# Patient Record
Sex: MALE | Race: WHITE | NOT HISPANIC OR LATINO | Employment: FULL TIME | URBAN - METROPOLITAN AREA
[De-identification: names, ages, dates, MRNs, and addresses within clinical notes are randomized per-mention and may not be internally consistent; named-entity substitution may affect disease eponyms.]

---

## 2023-03-28 ENCOUNTER — APPOINTMENT (OUTPATIENT)
Dept: MRI IMAGING | Facility: CLINIC | Age: 58
End: 2023-03-28
Attending: FAMILY MEDICINE
Payer: COMMERCIAL

## 2023-03-28 ENCOUNTER — HOSPITAL ENCOUNTER (EMERGENCY)
Facility: CLINIC | Age: 58
Discharge: HOME | End: 2023-03-28
Attending: FAMILY MEDICINE | Admitting: FAMILY MEDICINE
Payer: COMMERCIAL

## 2023-03-28 VITALS
RESPIRATION RATE: 18 BRPM | BODY MASS INDEX: 32.51 KG/M2 | HEART RATE: 68 BPM | OXYGEN SATURATION: 97 % | WEIGHT: 240 LBS | DIASTOLIC BLOOD PRESSURE: 87 MMHG | TEMPERATURE: 98.1 F | HEIGHT: 72 IN | SYSTOLIC BLOOD PRESSURE: 158 MMHG

## 2023-03-28 DIAGNOSIS — H81.399 PERIPHERAL VERTIGO, UNSPECIFIED LATERALITY: ICD-10-CM

## 2023-03-28 DIAGNOSIS — H93.19 TINNITUS, UNSPECIFIED LATERALITY: ICD-10-CM

## 2023-03-28 LAB
ANION GAP SERPL CALCULATED.3IONS-SCNC: 9 MMOL/L (ref 7–15)
BASOPHILS # BLD AUTO: 0.1 10E3/UL (ref 0–0.2)
BASOPHILS NFR BLD AUTO: 1 %
BUN SERPL-MCNC: 15.5 MG/DL (ref 6–20)
CALCIUM SERPL-MCNC: 9.5 MG/DL (ref 8.6–10)
CHLORIDE SERPL-SCNC: 103 MMOL/L (ref 98–107)
CREAT SERPL-MCNC: 1.05 MG/DL (ref 0.67–1.17)
DEPRECATED HCO3 PLAS-SCNC: 26 MMOL/L (ref 22–29)
EOSINOPHIL # BLD AUTO: 0.4 10E3/UL (ref 0–0.7)
EOSINOPHIL NFR BLD AUTO: 4 %
ERYTHROCYTE [DISTWIDTH] IN BLOOD BY AUTOMATED COUNT: 12.8 % (ref 10–15)
FLUAV RNA SPEC QL NAA+PROBE: NEGATIVE
FLUBV RNA RESP QL NAA+PROBE: NEGATIVE
GFR SERPL CREATININE-BSD FRML MDRD: 82 ML/MIN/1.73M2
GLUCOSE SERPL-MCNC: 92 MG/DL (ref 70–99)
HCT VFR BLD AUTO: 49.9 % (ref 40–53)
HGB BLD-MCNC: 17.4 G/DL (ref 13.3–17.7)
IMM GRANULOCYTES # BLD: 0 10E3/UL
IMM GRANULOCYTES NFR BLD: 0 %
LYMPHOCYTES # BLD AUTO: 2.8 10E3/UL (ref 0.8–5.3)
LYMPHOCYTES NFR BLD AUTO: 30 %
MCH RBC QN AUTO: 30.4 PG (ref 26.5–33)
MCHC RBC AUTO-ENTMCNC: 34.9 G/DL (ref 31.5–36.5)
MCV RBC AUTO: 87 FL (ref 78–100)
MONOCYTES # BLD AUTO: 0.8 10E3/UL (ref 0–1.3)
MONOCYTES NFR BLD AUTO: 9 %
NEUTROPHILS # BLD AUTO: 5.3 10E3/UL (ref 1.6–8.3)
NEUTROPHILS NFR BLD AUTO: 56 %
NRBC # BLD AUTO: 0 10E3/UL
NRBC BLD AUTO-RTO: 0 /100
PLATELET # BLD AUTO: 261 10E3/UL (ref 150–450)
POTASSIUM SERPL-SCNC: 4.5 MMOL/L (ref 3.4–5.3)
RBC # BLD AUTO: 5.73 10E6/UL (ref 4.4–5.9)
RSV RNA SPEC NAA+PROBE: NEGATIVE
SARS-COV-2 RNA RESP QL NAA+PROBE: NEGATIVE
SODIUM SERPL-SCNC: 138 MMOL/L (ref 136–145)
T4 FREE SERPL-MCNC: 1.4 NG/DL (ref 0.9–1.7)
TSH SERPL DL<=0.005 MIU/L-ACNC: 6.11 UIU/ML (ref 0.3–4.2)
WBC # BLD AUTO: 9.4 10E3/UL (ref 4–11)

## 2023-03-28 PROCEDURE — 70544 MR ANGIOGRAPHY HEAD W/O DYE: CPT

## 2023-03-28 PROCEDURE — 84443 ASSAY THYROID STIM HORMONE: CPT | Performed by: FAMILY MEDICINE

## 2023-03-28 PROCEDURE — C9803 HOPD COVID-19 SPEC COLLECT: HCPCS | Performed by: FAMILY MEDICINE

## 2023-03-28 PROCEDURE — A9585 GADOBUTROL INJECTION: HCPCS | Performed by: FAMILY MEDICINE

## 2023-03-28 PROCEDURE — 87637 SARSCOV2&INF A&B&RSV AMP PRB: CPT | Performed by: FAMILY MEDICINE

## 2023-03-28 PROCEDURE — 80048 BASIC METABOLIC PNL TOTAL CA: CPT | Performed by: FAMILY MEDICINE

## 2023-03-28 PROCEDURE — 85025 COMPLETE CBC W/AUTO DIFF WBC: CPT | Performed by: FAMILY MEDICINE

## 2023-03-28 PROCEDURE — 93010 ELECTROCARDIOGRAM REPORT: CPT | Performed by: FAMILY MEDICINE

## 2023-03-28 PROCEDURE — 70553 MRI BRAIN STEM W/O & W/DYE: CPT

## 2023-03-28 PROCEDURE — 255N000002 HC RX 255 OP 636: Performed by: FAMILY MEDICINE

## 2023-03-28 PROCEDURE — 93005 ELECTROCARDIOGRAM TRACING: CPT | Performed by: FAMILY MEDICINE

## 2023-03-28 PROCEDURE — 70549 MR ANGIOGRAPH NECK W/O&W/DYE: CPT

## 2023-03-28 PROCEDURE — 99285 EMERGENCY DEPT VISIT HI MDM: CPT | Mod: CS,25 | Performed by: FAMILY MEDICINE

## 2023-03-28 PROCEDURE — 84439 ASSAY OF FREE THYROXINE: CPT | Performed by: FAMILY MEDICINE

## 2023-03-28 PROCEDURE — 99284 EMERGENCY DEPT VISIT MOD MDM: CPT | Mod: CS | Performed by: FAMILY MEDICINE

## 2023-03-28 PROCEDURE — 96374 THER/PROPH/DIAG INJ IV PUSH: CPT | Mod: 59 | Performed by: FAMILY MEDICINE

## 2023-03-28 PROCEDURE — 96361 HYDRATE IV INFUSION ADD-ON: CPT | Performed by: FAMILY MEDICINE

## 2023-03-28 PROCEDURE — 258N000003 HC RX IP 258 OP 636: Performed by: FAMILY MEDICINE

## 2023-03-28 PROCEDURE — 36415 COLL VENOUS BLD VENIPUNCTURE: CPT | Performed by: FAMILY MEDICINE

## 2023-03-28 RX ORDER — GADOBUTROL 604.72 MG/ML
10 INJECTION INTRAVENOUS ONCE
Status: COMPLETED | OUTPATIENT
Start: 2023-03-28 | End: 2023-03-28

## 2023-03-28 RX ADMIN — GADOBUTROL 10 ML: 604.72 INJECTION INTRAVENOUS at 14:45

## 2023-03-28 RX ADMIN — SODIUM CHLORIDE 50 ML: 9 INJECTION, SOLUTION INTRAVENOUS at 15:10

## 2023-03-28 ASSESSMENT — ACTIVITIES OF DAILY LIVING (ADL)
ADLS_ACUITY_SCORE: 35
ADLS_ACUITY_SCORE: 33

## 2023-03-28 NOTE — ED TRIAGE NOTES
Pt has been having intermittent dizziness since January, constant the last 5 days.  Pt has lump on left side of neck for months that primary provider was not concerned about per pt.  Headache on back of neck started Friday or Saturday.      Triage Assessment     Row Name 03/28/23 1215       Triage Assessment (Adult)    Airway WDL WDL

## 2023-03-28 NOTE — ED NOTES
"Pt reports constant dizziness and feeling \"off\". Pt to have MRI of brain and neck; paperwork completed, and pt has removed all metals.   "

## 2023-03-28 NOTE — DISCHARGE INSTRUCTIONS
RETURN TO THE EMERGENCY ROOM FOR THE FOLLOWING:    Severely worsened dizziness with repeated vomiting and dehydration, fever greater than 101 and worsening head pain, or at anytime for any concern.    FOLLOW UP:    ENT (ear nose and throat) referral order placed at the time of your discharge.  Expect a phone call within the next 2-3 business days to help with scheduling.    TREATMENT RECOMMENDATIONS:    Ibuprofen 600 mg every six hours for pain (7 days duration).  Tylenol 1000 mg every six hours for pain (7 days duration).  Therefore, you can alternate these every three hours and do it safely.    NURSE ADVICE LINE:  [phone] or [phone]

## 2023-03-28 NOTE — ED PROVIDER NOTES
"  HPI   The patient is a 57-year-old male presenting with dizziness.  He has a known history of heavy alcohol use.  He will drink 2-3 times a week, 7-8 beers at a time.  He says, \"this is much less than I used to drink.  I do not think it is that big of a deal.\"  He occasionally will experience gout and he takes allopurinol.  He has esophagitis and he takes Prilosec.  He has hyperlipidemia and hypertension.  He has hypothyroidism and a thyroid nodule.  He takes Synthroid.  Known history of vitamin D deficiency.    The patient drank alcohol at the [employer] on Wednesday, 6 days ago.  The next morning he \"felt like I was still drunk.\"  He had 7-8 beers into the evening.  He described having persistent dizziness, \"like my equilibrium was off\" over the next 3 days.  Since then, he has had intermittent episodes of dizziness that seem to come on with movement and overall his symptoms have improved day by day.  He describes \"not being able to do anything though.  I could not drive my car well.  I could not seem to press the accelerator with my foot well.  I probably should not have been driving.  I probably should not have gone to work.\"  He describes a headache in the back of his head and in the base of his neck, left greater than right.  He denies trauma or injury.  He did have some nasal congestion earlier in the week but this has not been consistent through the weekend.  No cough.  No fever.  He gets transient nausea but no vomiting.  No diarrhea.  No myalgia.  He has felt rundown and tired.        Allergies:  Allergies   Allergen Reactions     Naproxen      tinnitus     Problem List:    There are no problems to display for this patient.     Past Medical History:    Past Medical History:   Diagnosis Date     Gout      Hypertension      Past Surgical History:    Past Surgical History:   Procedure Laterality Date     COLONOSCOPY N/A 12/15/2014    Procedure: COLONOSCOPY;  Surgeon: Js Oliva MD;  Location: [facility]" ESOPHAGOSCOPY, GASTROSCOPY, DUODENOSCOPY (EGD), COMBINED  12/27/2010    COMBINED ESOPHAGOSCOPY, GASTROSCOPY, DUODENOSCOPY (EGD) performed by TAINA ASH at [facility]     KNEE SURGERY       Family History:    No family history on file.  Social History:  Marital Status:   [2]  Social History     Tobacco Use     Smoking status: Never      Medications:    allopurinol (ZYLOPRIM) 300 MG tablet  levothyroxine (SYNTHROID,LEVOTHROID) 100 MCG tablet  Omeprazole 20 MG tablet      Review of Systems   All other systems reviewed and are negative.      PE   BP: (!) 158/87  Pulse: 68  Temp: 98.1  F (36.7  C)  Resp: 18  Height: 182.9 cm (6')  Weight: 108.9 kg (240 lb)  SpO2: 97 %  Physical Exam  Vitals and nursing note reviewed.   Constitutional:       General: He is not in acute distress.  HENT:      Head: Atraumatic.      Right Ear: External ear normal.      Left Ear: External ear normal.      Nose: Nose normal.      Mouth/Throat:      Mouth: Mucous membranes are moist.      Pharynx: Oropharynx is clear.   Eyes:      General: No scleral icterus.     Extraocular Movements: Extraocular movements intact.      Conjunctiva/sclera: Conjunctivae normal.      Pupils: Pupils are equal, round, and reactive to light.   Cardiovascular:      Rate and Rhythm: Normal rate.   Pulmonary:      Effort: Pulmonary effort is normal. No respiratory distress.   Musculoskeletal:         General: Normal range of motion.      Cervical back: Normal range of motion.   Skin:     General: Skin is warm and dry.   Neurological:      General: No focal deficit present.      Mental Status: He is alert and oriented to person, place, and time.      Comments: No dysarthria or dysphasia.  CN II-VIII intact grossly.  Moving U/L extremities B.  Strength 5/5 U/L extremities B.  Sensation intact grossly to touch (equal).  Rapid alternating movements intact.  Negative Rhomberg.  Normal finger-to-nose movments.   Psychiatric:         Behavior: Behavior normal.          ED COURSE and [facility]   1338.  Patient has symptoms and signs as described above.  Lab values pending.  MRA/MRI orders placed.  Concern for stroke is estimated at approximately 65%.  Peripheral source of dizziness is also certainly a possibility though he has headache and neck pain and had constant symptoms early on in the process with this description of foot dysfunction.  Infectious source could also cause generalized dizziness and not feeling well.  Uncertain how his alcohol intake contributed other than exposure to other people and potential infection and its immunosuppressive effects.    1615.  MRI/MRA results reviewed with the patient.  These are unremarkable/negative.  Peripheral source of vertigo likely.  No evidence for infectious etiology, unsupported by work-up results.  He does indicate that he has a history of tinnitus.  ENT referral order placed at the time of discharge.  Return for worsening as discussed.  Patient agrees with the plan.    EKG  (2284)   Interpretation performed by me.  Rate: 62     Rhythm: sinus     Axis: nl  Intervals: WV (12-2) 162, QRS (<12) 104, QTc (>5) 403  P wave: nl     QRS complex: nl   ST segment / T-wave: nl  Conclusion: nl    Electronic medical chart reviewed, including medical problems, medications, medical allergies, social history.  Recent hospitalizations and surgical procedures reviewed.  Recent clinic visits and consultations reviewed.  Recent labs and test results reviewed.  Nursing notes reviewed.    1615.  The patient, their parent if applicable, and/or their medical decision maker(s) and I have reviewed all of the available historical information, applicable PMH, physical exam findings, and objective diagnostic data gathered during this ED visit.  We then discussed all work-up options and then together agreed upon the course taken during this visit.  The ultimate disposition and plan was a cooperative decision made between myself and the patient, their parent  if applicable, and/or their legal decision maker(s).  The risks and benefits of all decisions made during this visit were discussed to the best of my abilities given the circumstances, and all parties are understanding of the pertinent ramifications of these decisions.      LABS  Labs Ordered and Resulted from Time of ED Arrival to Time of ED Departure   TSH WITH FREE T4 REFLEX - Abnormal       Result Value    TSH 6.11 (*)    BASIC METABOLIC PANEL - Normal    Sodium 138      Potassium 4.5      Chloride 103      Carbon Dioxide (CO2) 26      Anion Gap 9      Urea Nitrogen 15.5      Creatinine 1.05      Calcium 9.5      Glucose 92      GFR Estimate 82     INFLUENZA A/B, RSV, & SARS-COV2 PCR - Normal    Influenza A PCR Negative      Influenza B PCR Negative      RSV PCR Negative      SARS CoV2 PCR Negative     CBC WITH PLATELETS AND DIFFERENTIAL    WBC Count 9.4      RBC Count 5.73      Hemoglobin 17.4      Hematocrit 49.9      MCV 87      MCH 30.4      MCHC 34.9      RDW 12.8      Platelet Count 261      % Neutrophils 56      % Lymphocytes 30      % Monocytes 9      % Eosinophils 4      % Basophils 1      % Immature Granulocytes 0      NRBCs per 100 WBC 0      Absolute Neutrophils 5.3      Absolute Lymphocytes 2.8      Absolute Monocytes 0.8      Absolute Eosinophils 0.4      Absolute Basophils 0.1      Absolute Immature Granulocytes 0.0      Absolute NRBCs 0.0     T4 FREE       IMAGING  Images reviewed by me.  Radiology report also reviewed.  MRA Brain ([place]) wo Contrast   Final Result   IMPRESSION:  Normal MR angiogram of the head.        LOTTIE GLASGOW MD            SYSTEM ID:  [id number]      MRA Neck (Carot[place]) wo & w Contrast   Final Result   IMPRESSION:  Normal MR angiogram of the neck.       LOTTIE GLASGOW MD            SYSTEM ID:  [id number]      MR Brain w/o & w Contrast   Final Result   IMPRESSION: Normal brain MRI.      LOTTIE GLASGOW MD            SYSTEM ID:  [id number]           Procedures    Medications   gadobutrol (GADAVIST) injection 10 mL (10 mLs Intravenous $Given 3/28/23 1448)   0.9% sodium chloride BOLUS (50 mLs Intravenous $New Bag 3/28/23 1510)         IMPRESSION       ICD-10-CM    1. Peripheral vertigo, unspecified laterality  H81.399       2. Tinnitus, unspecified laterality  H93.19                Medication List      There are no discharge medications for this visit.                     Eladio Mendoza MD  03/28/23 6014

## 2024-03-27 ENCOUNTER — TRANSCRIBE ORDERS (OUTPATIENT)
Dept: OTHER | Age: 59
End: 2024-03-27

## 2024-03-27 DIAGNOSIS — Z12.11 COLON CANCER SCREENING: Primary | ICD-10-CM

## 2025-03-19 ENCOUNTER — TELEPHONE (OUTPATIENT)
Dept: GASTROENTEROLOGY | Facility: CLINIC | Age: 60
End: 2025-03-19
Payer: COMMERCIAL

## 2025-03-19 NOTE — TELEPHONE ENCOUNTER
Endoscopy Scheduling Screen      What insurance is in the chart?  Other:  [facility]    Ordering/Referring Provider: Wicho Hardy PA-C    (If ordering provider performs procedure, schedule with ordering provider unless otherwise instructed. )    BMI: There is no height or weight on file to calculate BMI.     Sedation Ordered  general anesthesia.   BMI<= 45 45 < BMI <= 48 48 < BMI < = 50  BMI > 50   No Restrictions No MG ASC  No [facility]  [facility] with exceptions Hospital Only OR Only       Do you have a history of malignant hyperthermia?  NO    (Females) Are you currently pregnant?   NO     Are you currently on dialysis?   NO    Do you need assistance transferring?   NO    BMI: There is no height or weight on file to calculate BMI.     Is patients BMI > 50?  NO    BMI > 40?  NO    Do you have a diagnosis of diabetes?  NO    Do you take an Oral or Injectable medication for weight loss or diabetes (excluding insulin)?  NO    Do you take the medication Naltrexone?  NO    Do you take blood thinners?  NO    Prep   Are you currently have chronic kidney disease?  NO    Do you have a diagnosis of cystic fibrosis (CF)?  NO    On a regular basis do you go 3 -5 days between bowel movements?  NO    Preferred Pharmacy:  No Pharmacies Listed    Final Scheduling Details     Procedure scheduled  Colonoscopy    Surgeon:  Cassie      Date of procedure:  5/5/25     [facility] order.    What is your communication preference for Instructions and/or Bowel Prep?   "[employer]    Patient Reminders:    You will receive a call from a Nurse to review instructions and health history.  This assessment must be completed prior to your procedure.  Failure to complete the Nurse assessment may result in the procedure being cancelled.       On the day of your procedure, please designate an adult(s) who can drive you home stay with you for the next 24 hours. The medicines used in the exam will make you sleepy. You will not be  able to drive.       You cannot take public transportation, ride share services, or non-medical taxi service without a responsible caregiver.  Medical transport services are allowed with the requirement that a responsible caregiver will receive you at your destination.  We require that drivers and caregivers are confirmed prior to your procedure.

## 2025-05-01 ENCOUNTER — ANESTHESIA EVENT (OUTPATIENT)
Dept: GASTROENTEROLOGY | Facility: CLINIC | Age: 60
End: 2025-05-01
Payer: COMMERCIAL

## 2025-05-01 NOTE — ANESTHESIA PREPROCEDURE EVALUATION
Anesthesia Pre-Procedure Evaluation    Patient: Lazarus Osman   MRN: [id number] : 1965        Procedure : Procedure(s):  Colonoscopy          Past Medical History:   Diagnosis Date    Gout     Hypertension       Past Surgical History:   Procedure Laterality Date    COLONOSCOPY N/A 12/15/2014    Procedure: COLONOSCOPY;  Surgeon: Taian Ash MD;  Location: [facility]    ESOPHAGOSCOPY, GASTROSCOPY, DUODENOSCOPY (EGD), COMBINED  2010    COMBINED ESOPHAGOSCOPY, GASTROSCOPY, DUODENOSCOPY (EGD) performed by TAINA ASH at [facility]    KNEE SURGERY        Allergies   Allergen Reactions    Naproxen      tinnitus      Social History     Tobacco Use    Smoking status: Never    Smokeless tobacco: Not on file   Substance Use Topics    Alcohol use: Not on file      Wt Readings from Last 1 Encounters:   23 108.9 kg (240 lb)        Anesthesia Evaluation   Pt has had prior anesthetic. Type: General and MAC.        ROS/MED HX  ENT/Pulmonary:  - neg pulmonary ROS     Neurologic:  - neg neurologic ROS     Cardiovascular:     (+)  hypertension- -   -  - -                                 Previous cardiac testing   Echo: Date: Results:    Stress Test:  Date: Results:    ECG Reviewed:  Date: 3/23 Results:  Sinus  Rhythm   -consider old anterior infarct.   Cath:  Date: Results:      METS/Exercise Tolerance:     Hematologic:  - neg hematologic  ROS     Musculoskeletal:  - neg musculoskeletal ROS     GI/Hepatic:     (+)        bowel prep,            Renal/Genitourinary:  - neg Renal ROS     Endo:     (+)               Obesity,       Psychiatric/Substance Use:  - neg psychiatric ROS     Infectious Disease:  - neg infectious disease ROS     Malignancy:       Other:  - neg other ROS          Physical Exam    Airway  airway exam normal      Mallampati: II   TM distance: > 3 FB   Neck ROM: full   Mouth opening: > 3 cm    Respiratory Devices and Support         Dental       (+) Minor Abnormalities - some fillings, tiny  "chips      Cardiovascular   cardiovascular exam normal       Rhythm and rate: regular and normal     Pulmonary   pulmonary exam normal        breath sounds clear to auscultation           OUTSIDE LABS:  CBC:   Lab Results   Component Value Date    WBC 9.4 03/28/2023    HGB 17.4 03/28/2023    HCT 49.9 03/28/2023     03/28/2023     BMP:   Lab Results   Component Value Date     03/28/2023    POTASSIUM 4.5 03/28/2023    CHLORIDE 103 03/28/2023    CO2 26 03/28/2023    BUN 15.5 03/28/2023    CR 1.05 03/28/2023    GLC 92 03/28/2023     COAGS: No results found for: \"PTT\", \"INR\", \"FIBR\"  POC: No results found for: \"BGM\", \"HCG\", \"HCGS\"  HEPATIC: No results found for: \"ALBUMIN\", \"PROTTOTAL\", \"ALT\", \"AST\", \"GGT\", \"ALKPHOS\", \"BILITOTAL\", \"BILIDIRECT\", \"DUNG\"  OTHER:   Lab Results   Component Value Date    ALYSSA 9.5 03/28/2023    TSH 6.11 (H) 03/28/2023    T4 1.40 03/28/2023       Anesthesia Plan    ASA Status:  2    NPO Status:  NPO Appropriate    Anesthesia Type: General.     - Airway: Native airway   Induction: Propofol.           Consents    Anesthesia Plan(s) and associated risks, benefits, and realistic alternatives discussed. Questions answered and patient/representative(s) expressed understanding.     - Discussed: Risks, Benefits and Alternatives for BOTH SEDATION and the PROCEDURE were discussed     - Discussed with:  Patient      - Extended Intubation/Ventilatory Support Discussed: No.      - Patient is DNR/DNI Status: No     Use of blood products discussed: No .     Postoperative Care    Pain management: IV analgesics.   PONV prophylaxis: Background Propofol Infusion     Comments:               JAYLENE Reyna CRNA    Clinically Significant Risk Factors Present on Admission                                          "

## 2025-05-05 ENCOUNTER — HOSPITAL ENCOUNTER (OUTPATIENT)
Facility: CLINIC | Age: 60
Discharge: HOME | End: 2025-05-05
Attending: SURGERY | Admitting: SURGERY
Payer: COMMERCIAL

## 2025-05-05 ENCOUNTER — ANESTHESIA (OUTPATIENT)
Dept: GASTROENTEROLOGY | Facility: CLINIC | Age: 60
End: 2025-05-05
Payer: COMMERCIAL

## 2025-05-05 VITALS
HEIGHT: 72 IN | HEART RATE: 87 BPM | BODY MASS INDEX: 32.51 KG/M2 | WEIGHT: 240 LBS | RESPIRATION RATE: 18 BRPM | DIASTOLIC BLOOD PRESSURE: 92 MMHG | TEMPERATURE: 97.1 F | OXYGEN SATURATION: 96 % | SYSTOLIC BLOOD PRESSURE: 128 MMHG

## 2025-05-05 LAB — COLONOSCOPY: NORMAL

## 2025-05-05 PROCEDURE — 250N000009 HC RX 250: Performed by: NURSE ANESTHETIST, CERTIFIED REGISTERED

## 2025-05-05 PROCEDURE — G0121 COLON CA SCRN NOT HI RSK IND: HCPCS

## 2025-05-05 PROCEDURE — 250N000009 HC RX 250: Performed by: PHYSICIAN ASSISTANT

## 2025-05-05 PROCEDURE — 258N000003 HC RX IP 258 OP 636: Performed by: PHYSICIAN ASSISTANT

## 2025-05-05 PROCEDURE — 370N000017 HC ANESTHESIA TECHNICAL FEE, PER MIN: Performed by: SURGERY

## 2025-05-05 PROCEDURE — 250N000011 HC RX IP 250 OP 636: Performed by: NURSE ANESTHETIST, CERTIFIED REGISTERED

## 2025-05-05 PROCEDURE — 45378 DIAGNOSTIC COLONOSCOPY: CPT | Performed by: SURGERY

## 2025-05-05 RX ORDER — NALOXONE HYDROCHLORIDE 0.4 MG/ML
0.2 INJECTION, SOLUTION INTRAMUSCULAR; INTRAVENOUS; SUBCUTANEOUS
Status: DISCONTINUED | OUTPATIENT
Start: 2025-05-05 | End: 2025-05-05 | Stop reason: HOSPADM

## 2025-05-05 RX ORDER — NALOXONE HYDROCHLORIDE 0.4 MG/ML
0.4 INJECTION, SOLUTION INTRAMUSCULAR; INTRAVENOUS; SUBCUTANEOUS
Status: DISCONTINUED | OUTPATIENT
Start: 2025-05-05 | End: 2025-05-05 | Stop reason: HOSPADM

## 2025-05-05 RX ORDER — FLUMAZENIL 0.1 MG/ML
0.2 INJECTION, SOLUTION INTRAVENOUS
Status: DISCONTINUED | OUTPATIENT
Start: 2025-05-05 | End: 2025-05-05 | Stop reason: HOSPADM

## 2025-05-05 RX ORDER — FEXOFENADINE HCL 60 MG/1
60 TABLET, FILM COATED ORAL 2 TIMES DAILY
COMMUNITY

## 2025-05-05 RX ORDER — PROPOFOL 10 MG/ML
INJECTION, EMULSION INTRAVENOUS CONTINUOUS PRN
Status: DISCONTINUED | OUTPATIENT
Start: 2025-05-05 | End: 2025-05-05

## 2025-05-05 RX ORDER — LIDOCAINE 40 MG/G
CREAM TOPICAL
Status: DISCONTINUED | OUTPATIENT
Start: 2025-05-05 | End: 2025-05-05 | Stop reason: HOSPADM

## 2025-05-05 RX ORDER — LIDOCAINE HYDROCHLORIDE 20 MG/ML
INJECTION, SOLUTION INFILTRATION; PERINEURAL PRN
Status: DISCONTINUED | OUTPATIENT
Start: 2025-05-05 | End: 2025-05-05

## 2025-05-05 RX ORDER — PROPOFOL 10 MG/ML
INJECTION, EMULSION INTRAVENOUS PRN
Status: DISCONTINUED | OUTPATIENT
Start: 2025-05-05 | End: 2025-05-05

## 2025-05-05 RX ORDER — SODIUM CHLORIDE, SODIUM LACTATE, POTASSIUM CHLORIDE, CALCIUM CHLORIDE 600; 310; 30; 20 MG/100ML; MG/100ML; MG/100ML; MG/100ML
INJECTION, SOLUTION INTRAVENOUS CONTINUOUS
Status: DISCONTINUED | OUTPATIENT
Start: 2025-05-05 | End: 2025-05-05 | Stop reason: HOSPADM

## 2025-05-05 RX ADMIN — LIDOCAINE HYDROCHLORIDE 100 MG: 20 INJECTION, SOLUTION INFILTRATION; PERINEURAL at 10:41

## 2025-05-05 RX ADMIN — PROPOFOL 150 MCG/KG/MIN: 10 INJECTION, EMULSION INTRAVENOUS at 10:42

## 2025-05-05 RX ADMIN — SODIUM CHLORIDE, POTASSIUM CHLORIDE, SODIUM LACTATE AND CALCIUM CHLORIDE 1000 ML: 600; 310; 30; 20 INJECTION, SOLUTION INTRAVENOUS at 09:59

## 2025-05-05 RX ADMIN — PROPOFOL 120 MG: 10 INJECTION, EMULSION INTRAVENOUS at 10:41

## 2025-05-05 RX ADMIN — LIDOCAINE HYDROCHLORIDE 0.1 ML: 10 INJECTION, SOLUTION EPIDURAL; INFILTRATION; INTRACAUDAL; PERINEURAL at 09:59

## 2025-05-05 ASSESSMENT — ACTIVITIES OF DAILY LIVING (ADL)
ADLS_ACUITY_SCORE: 41
ADLS_ACUITY_SCORE: 41

## 2025-05-05 NOTE — ANESTHESIA CARE TRANSFER NOTE
Patient: Lazarus Osman    Procedure: Procedure(s):  Colonoscopy       Diagnosis: Colon cancer screening [Z12.11]  Diagnosis Additional Information: No value filed.    Anesthesia Type:   General     Note:    Oropharynx: oropharynx clear of all foreign objects  Level of Consciousness: drowsy  Oxygen Supplementation: room air    Independent Airway: airway patency satisfactory and stable  Dentition: dentition unchanged  Vital Signs Stable: post-procedure vital signs reviewed and stable  Report to RN Given: handoff report given  Patient transferred to: Phase II    Handoff Report: Identifed the Patient, Identified the Reponsible Provider, Reviewed the pertinent medical history, Discussed the surgical course, Reviewed Intra-OP anesthesia mangement and issues during anesthesia, Set expectations for post-procedure period and Allowed opportunity for questions and acknowledgement of understanding      Vitals:  Vitals Value Taken Time   /76 05/05/25 1114   Temp 36.2  C (97.1  F) 05/05/25 1114   Pulse 86 05/05/25 1114   Resp 16 05/05/25 1114   SpO2 96 % 05/05/25 1118   Vitals shown include unfiled device data.    Electronically Signed By: JAYLENE Richmond CRNA  May 5, 2025  11:19 AM

## 2025-05-05 NOTE — H&P
[facility]    Pre-Endoscopy History and Physical     Lazarus Osman MRN# [id number]   YOB: 1965 Age: 59 year old     Date of Procedure: 5/5/2025  Primary care provider: Myron Bob Sparks  Type of Endoscopy: Colonoscopy with possible biopsy, possible polypectomy  Reason for Procedure: Screening  Type of Anesthesia Anticipated: MAC    HPI:    Lazarus is a 59 year old male who will be undergoing the above procedure.      A history and physical has been performed. The patient's medications and allergies have been reviewed. The risks and benefits of the procedure and the sedation options and risks were discussed with the patient.  All questions were answered and informed consent was obtained.      He denies a personal or family history of anesthesia complications or bleeding disorders.     Screening colonoscopy, no family history of CRC, no anticoagulation, no colon surgery, last colonoscopy 2014 with no specimens.     There is no problem list on file for this patient.       Past Medical History:   Diagnosis Date    Gout     Hypertension         Past Surgical History:   Procedure Laterality Date    COLONOSCOPY N/A 12/15/2014    Procedure: COLONOSCOPY;  Surgeon: Taina Ash MD;  Location: [facility]    ESOPHAGOSCOPY, GASTROSCOPY, DUODENOSCOPY (EGD), COMBINED  12/27/2010    COMBINED ESOPHAGOSCOPY, GASTROSCOPY, DUODENOSCOPY (EGD) performed by TAINA ASH at [facility]    KNEE SURGERY         Social History     Tobacco Use    Smoking status: Never    Smokeless tobacco: Not on file   Substance Use Topics    Alcohol use: Not on file       History reviewed. No pertinent family history.    Prior to Admission medications    Medication Sig Start Date End Date Taking? Authorizing Provider   allopurinol (ZYLOPRIM) 300 MG tablet Take 300 mg by mouth daily.   Yes Reported, Patient   fexofenadine (ALLEGRA) 60 MG tablet Take 60 mg by mouth 2 times daily.   Yes Reported, Patient    levothyroxine (SYNTHROID,LEVOTHROID) 100 MCG tablet Take by mouth daily   Yes Reported, Patient   Omeprazole 20 MG tablet Take 20 mg by mouth daily.    Reported, Patient       Allergies   Allergen Reactions    Naproxen      tinnitus        REVIEW OF SYSTEMS:   5 point ROS negative except as noted above in HPI, including Gen., Resp., CV, GI &  system review.    PHYSICAL EXAM:   BP (!) 137/99 (BP Location: Left arm)   Pulse 74   Temp 98  F (36.7  C) (Oral)   Resp 18   Ht 1.829 m (6')   Wt 108.9 kg (240 lb)   SpO2 97%   BMI 32.55 kg/m   Estimated body mass index is 32.55 kg/m  as calculated from the following:    Height as of this encounter: 1.829 m (6').    Weight as of this encounter: 108.9 kg (240 lb).   Constitutional: Awake, alert, no acute distress.  Eyes: No scleral icterus.  Conjunctiva are without injection.  ENMT: Mucous membranes moist, dentition and gums are intact.   Neck: Soft, supple, trachea midline.    Endocrine: n/a   Lymphatic: There is no cervical, submandibularadenopathy.  Respiratory: normal efforts on room air  Cardiovascular: extremities warm and well perfused  Abdomen: Non-distended, non-tender,  No masses,  Musculoskeletal: Full range of motion in the upper and lower extremities.    Skin: No skin rashes or lesions to inspection.  No petechia.    Neurologic: alerted and oriented 3x  Psychiatric: The patient's affect is not blunted and mood is appropriate.  DIAGNOSTICS:    Not indicated    IMPRESSION   ASA Class 2 - Mild systemic disease    PLAN:   Plan for Colonoscopy with possible biopsy, possible polypectomy. We discussed the risks, benefits and alternatives and the patient wished to proceed.  Patient is cleared for the above procedure.    The above has been forwarded to the consulting provider.    Long Mays, DO PGY2 on 5/5/2025 at 10:02 AM  [facility] Surgery

## 2025-05-05 NOTE — ANESTHESIA POSTPROCEDURE EVALUATION
Patient: Lazarus Osman    Procedure: Procedure(s):  Colonoscopy       Anesthesia Type:  General    Note:  Disposition: Outpatient   Postop Pain Control: Uneventful            Sign Out: Well controlled pain   PONV: No   Neuro/Psych: Uneventful            Sign Out: Acceptable/Baseline neuro status   Airway/Respiratory: Uneventful            Sign Out: Acceptable/Baseline resp. status   CV/Hemodynamics: Uneventful            Sign Out: Acceptable CV status; No obvious hypovolemia; No obvious fluid overload   Other NRE: NONE   DID A NON-ROUTINE EVENT OCCUR? No           Last vitals:  Vitals Value Taken Time   /92 05/05/25 1130   Temp 36.2  C (97.1  F) 05/05/25 1114   Pulse 87 05/05/25 1130   Resp 18 05/05/25 1130   SpO2 96 % 05/05/25 1130       Electronically Signed By: JAYLENE Richmond CRNA  May 5, 2025  11:48 AM

## 2025-08-16 ENCOUNTER — HEALTH MAINTENANCE LETTER (OUTPATIENT)
Age: 60
End: 2025-08-16

## (undated) RX ORDER — PROPOFOL 10 MG/ML
INJECTION, EMULSION INTRAVENOUS
Status: DISPENSED
Start: 2025-05-05

## (undated) RX ORDER — LIDOCAINE HYDROCHLORIDE 10 MG/ML
INJECTION, SOLUTION EPIDURAL; INFILTRATION; INTRACAUDAL; PERINEURAL
Status: DISPENSED
Start: 2025-05-05